# Patient Record
Sex: FEMALE | Race: BLACK OR AFRICAN AMERICAN | NOT HISPANIC OR LATINO | URBAN - METROPOLITAN AREA
[De-identification: names, ages, dates, MRNs, and addresses within clinical notes are randomized per-mention and may not be internally consistent; named-entity substitution may affect disease eponyms.]

---

## 2022-12-04 ENCOUNTER — EMERGENCY (EMERGENCY)
Facility: HOSPITAL | Age: 27
LOS: 0 days | Discharge: HOME | End: 2022-12-04
Attending: STUDENT IN AN ORGANIZED HEALTH CARE EDUCATION/TRAINING PROGRAM

## 2022-12-04 VITALS
TEMPERATURE: 98 F | RESPIRATION RATE: 17 BRPM | SYSTOLIC BLOOD PRESSURE: 110 MMHG | HEART RATE: 92 BPM | DIASTOLIC BLOOD PRESSURE: 78 MMHG | OXYGEN SATURATION: 99 %

## 2022-12-04 VITALS
DIASTOLIC BLOOD PRESSURE: 81 MMHG | RESPIRATION RATE: 18 BRPM | SYSTOLIC BLOOD PRESSURE: 115 MMHG | TEMPERATURE: 99 F | HEART RATE: 110 BPM | HEIGHT: 62 IN | OXYGEN SATURATION: 100 % | WEIGHT: 110.01 LBS

## 2022-12-04 DIAGNOSIS — R05.8 OTHER SPECIFIED COUGH: ICD-10-CM

## 2022-12-04 DIAGNOSIS — R51.9 HEADACHE, UNSPECIFIED: ICD-10-CM

## 2022-12-04 DIAGNOSIS — M54.9 DORSALGIA, UNSPECIFIED: ICD-10-CM

## 2022-12-04 DIAGNOSIS — Z20.822 CONTACT WITH AND (SUSPECTED) EXPOSURE TO COVID-19: ICD-10-CM

## 2022-12-04 DIAGNOSIS — R00.0 TACHYCARDIA, UNSPECIFIED: ICD-10-CM

## 2022-12-04 LAB
FLUAV AG NPH QL: DETECTED
FLUBV AG NPH QL: SIGNIFICANT CHANGE UP
SARS-COV-2 RNA SPEC QL NAA+PROBE: SIGNIFICANT CHANGE UP

## 2022-12-04 PROCEDURE — 99284 EMERGENCY DEPT VISIT MOD MDM: CPT

## 2022-12-04 RX ORDER — DEXAMETHASONE 0.5 MG/5ML
4 ELIXIR ORAL ONCE
Refills: 0 | Status: COMPLETED | OUTPATIENT
Start: 2022-12-04 | End: 2022-12-04

## 2022-12-04 RX ORDER — KETOROLAC TROMETHAMINE 30 MG/ML
30 SYRINGE (ML) INJECTION ONCE
Refills: 0 | Status: DISCONTINUED | OUTPATIENT
Start: 2022-12-04 | End: 2022-12-04

## 2022-12-04 RX ADMIN — Medication 30 MILLIGRAM(S): at 14:16

## 2022-12-04 RX ADMIN — Medication 4 MILLIGRAM(S): at 14:16

## 2022-12-04 RX ADMIN — Medication 200 MILLIGRAM(S): at 14:16

## 2022-12-04 NOTE — ED ADULT NURSE NOTE - NSIMPLEMENTINTERV_GEN_ALL_ED
Implemented All Universal Safety Interventions:  [place] to call system. Call bell, personal items and telephone within reach. Instruct patient to call for assistance. Room bathroom lighting operational. Non-slip footwear when patient is off stretcher. Physically safe environment: no spills, clutter or unnecessary equipment. Stretcher in lowest position, wheels locked, appropriate side rails in place.

## 2022-12-04 NOTE — ED PROVIDER NOTE - OBJECTIVE STATEMENT
27F pw flu-like symptoms onset yesterday. Pt endorses h/a, generalized 'skin' pain, back pain, ST, dry cough. Denies recent travel, known sick contacts. Pt taking Tylenol w/o relief. Denies dizziness, CP, SOB, abd pain, N/V/D/C, UTI sx, LE swelling, rash.     PMH none, PSH none, NKDA, no meds, no LMP 2/2 depo. + vaccinated against COVID.

## 2022-12-04 NOTE — ED ADULT NURSE NOTE - BREATHING, MLM
Schedule an appointment to see Dr. Patricio.  Use the Symbicort inhalers.  Our office will call you with your OFT results when they are finalized.   Spontaneous, unlabored and symmetrical

## 2022-12-04 NOTE — ED PROVIDER NOTE - PATIENT PORTAL LINK FT
You can access the FollowMyHealth Patient Portal offered by [facility] by registering at the following website: [email or web address]. By joining [employer]’s FollowMyHealth portal, you will also be able to view your health information using other applications (apps) compatible with our system.

## 2022-12-04 NOTE — ED PROVIDER NOTE - CLINICAL SUMMARY MEDICAL DECISION MAKING FREE TEXT BOX
Otherwise healthy 27F pw flu-like symptoms onset yesterday. AF, VSS other than + mild tachycardia. Ill-appearing, but non toxic appearing. No meningismus. Plan: Send Flu/COVID, give Toradol / Tessalon / Decadron. Re-eval. Anticipate d/c home.

## 2022-12-04 NOTE — ED PROVIDER NOTE - PROGRESS NOTE DETAILS
W/u significant for: Flu A (+). Stable for d/c home. Given script for Tamiflu. Recommend Tylenol / Motrin PRN symptomatic relief, continued good PO hydration and close outpatient PCP f/u. Return signs / symptoms d/w pt. She understands / agrees w/ this plan.

## 2025-03-27 NOTE — ED ADULT NURSE NOTE - NS ED NURSE LEVEL OF CONSCIOUSNESS ORIENTATION
Initiate Treatment: spironolactone 25 mg tablet \\nQuantity: 90.0 Tablet  Days Supply: 30\\nSig: Take 3 pills (75 mg) once a day as tolerated Otc Regimen: Minoxidil 5% over the counter Plan: Spironolactone 75mg to be sent once labs received and reviewed Detail Level: Zone Oriented - self; Oriented - place; Oriented - time